# Patient Record
Sex: FEMALE | Race: WHITE | NOT HISPANIC OR LATINO | Employment: FULL TIME | URBAN - METROPOLITAN AREA
[De-identification: names, ages, dates, MRNs, and addresses within clinical notes are randomized per-mention and may not be internally consistent; named-entity substitution may affect disease eponyms.]

---

## 2018-02-12 ENCOUNTER — OFFICE VISIT (OUTPATIENT)
Dept: INTERNAL MEDICINE | Facility: CLINIC | Age: 48
End: 2018-02-12
Payer: COMMERCIAL

## 2018-02-12 VITALS
BODY MASS INDEX: 36.59 KG/M2 | DIASTOLIC BLOOD PRESSURE: 96 MMHG | TEMPERATURE: 96 F | HEART RATE: 75 BPM | HEIGHT: 64 IN | SYSTOLIC BLOOD PRESSURE: 140 MMHG | WEIGHT: 214.31 LBS | OXYGEN SATURATION: 95 %

## 2018-02-12 DIAGNOSIS — R22.31 NODULE OF FINGER OF RIGHT HAND: Primary | ICD-10-CM

## 2018-02-12 PROCEDURE — 99999 PR PBB SHADOW E&M-NEW PATIENT-LVL III: CPT | Mod: PBBFAC,,, | Performed by: PHYSICIAN ASSISTANT

## 2018-02-12 PROCEDURE — 3008F BODY MASS INDEX DOCD: CPT | Mod: S$GLB,,, | Performed by: PHYSICIAN ASSISTANT

## 2018-02-12 PROCEDURE — 99203 OFFICE O/P NEW LOW 30 MIN: CPT | Mod: S$GLB,,, | Performed by: PHYSICIAN ASSISTANT

## 2018-02-12 RX ORDER — SULFAMETHOXAZOLE AND TRIMETHOPRIM 800; 160 MG/1; MG/1
1 TABLET ORAL 2 TIMES DAILY
Qty: 20 TABLET | Refills: 0 | Status: SHIPPED | OUTPATIENT
Start: 2018-02-12 | End: 2018-02-22

## 2018-02-12 NOTE — PROGRESS NOTES
Subjective:       Patient ID: Monserrat Lyels is a 47 y.o. female.    Chief Complaint: Hand Pain (right hand middle finger)    Hand Pain    The incident occurred more than 1 week ago. There was no injury mechanism. The pain is present in the right fingers. The pain is mild. Pertinent negatives include no chest pain. Associated symptoms comments: Has a nodule present over the PIP joint of the right finger. The symptoms are aggravated by movement and palpation. Treatments tried: Was seen at [facility], and they said it was arthitis.     Review of Systems   Constitutional: Negative for chills, fatigue and fever.   Respiratory: Negative for chest tightness and shortness of breath.    Cardiovascular: Negative for chest pain.   Gastrointestinal: Negative for abdominal pain.       Objective:      Physical Exam   Constitutional: She appears well-developed and well-nourished. No distress.   Musculoskeletal:        Hands:  Skin: She is not diaphoretic.   Nursing note and vitals reviewed.      Assessment:       1. Nodule of finger of right hand        Plan:       Nodule of finger of right hand  -     Ambulatory referral to Orthopedics    Other orders  -     sulfamethoxazole-trimethoprim 800-160mg (BACTRIM DS) 800-160 mg Tab; Take 1 tablet by mouth 2 (two) times daily.  Dispense: 20 tablet; Refill: 0

## 2018-02-20 DIAGNOSIS — M79.641 RIGHT HAND PAIN: Primary | ICD-10-CM

## 2018-02-21 ENCOUNTER — OFFICE VISIT (OUTPATIENT)
Dept: ORTHOPEDICS | Facility: CLINIC | Age: 48
End: 2018-02-21
Payer: COMMERCIAL

## 2018-02-21 ENCOUNTER — HOSPITAL ENCOUNTER (OUTPATIENT)
Dept: RADIOLOGY | Facility: HOSPITAL | Age: 48
Discharge: HOME | End: 2018-02-21
Attending: ORTHOPAEDIC SURGERY
Payer: COMMERCIAL

## 2018-02-21 VITALS — BODY MASS INDEX: 36.54 KG/M2 | HEIGHT: 64 IN | WEIGHT: 214 LBS

## 2018-02-21 DIAGNOSIS — M79.641 RIGHT HAND PAIN: ICD-10-CM

## 2018-02-21 DIAGNOSIS — M79.644 FINGER PAIN, RIGHT: Primary | ICD-10-CM

## 2018-02-21 DIAGNOSIS — L02.511 ABSCESS OF FINGER, RIGHT: ICD-10-CM

## 2018-02-21 PROCEDURE — 73130 X-RAY EXAM OF HAND: CPT | Mod: TC,PO,RT

## 2018-02-21 PROCEDURE — 99203 OFFICE O/P NEW LOW 30 MIN: CPT | Mod: S$GLB,,, | Performed by: ORTHOPAEDIC SURGERY

## 2018-02-21 PROCEDURE — 73130 X-RAY EXAM OF HAND: CPT | Mod: 26,RT,, | Performed by: RADIOLOGY

## 2018-02-21 PROCEDURE — 99999 PR PBB SHADOW E&M-EST. PATIENT-LVL II: CPT | Mod: PBBFAC,,, | Performed by: ORTHOPAEDIC SURGERY

## 2018-02-21 PROCEDURE — 3008F BODY MASS INDEX DOCD: CPT | Mod: S$GLB,,, | Performed by: ORTHOPAEDIC SURGERY

## 2018-02-21 RX ORDER — AMOXICILLIN AND CLAVULANATE POTASSIUM 500; 125 MG/1; MG/1
1 TABLET, FILM COATED ORAL 3 TIMES DAILY
Qty: 30 TABLET | Refills: 0 | Status: SHIPPED | OUTPATIENT
Start: 2018-02-21 | End: 2018-02-28 | Stop reason: SDUPTHER

## 2018-02-21 NOTE — LETTER
February 21, 2018      Juwan Thomas III, PA-C  [address] [place] [address]           [facility]  [address] [address]  Phone: [phone]          Patient: Monserrat Lyles   MR Number: [id number]   YOB: 1970   Date of Visit: 2/21/2018       Dear Juwan Thomas III:    Thank you for referring Monserrat Lyles to me for evaluation. Attached you will find relevant portions of my assessment and plan of care.    If you have questions, please do not hesitate to call me. I look forward to following Monserrat Lyles along with you.    Sincerely,    Denzel Ruggiero MD    Enclosure  CC:  No Recipients    If you would like to receive this communication electronically, please contact [email or web address] or [phone] to request more information on [employer] Link access.    For providers and/or their staff who would like to refer a patient to [facility], please contact us through our one-stop-shop provider referral line, Radhika Davis, at [phone].    If you feel you have received this communication in error or would no longer like to receive these types of communications, please e-mail [email or web address]

## 2018-02-21 NOTE — PROGRESS NOTES
DATE: 2/21/2018  PATIENT: Monserrat Lyles  REFERRING MD:   CHIEF COMPLAINT:   Chief Complaint   Patient presents with    Hand Pain     right        HISTORY:  Monserrat Lyles is a 47 y.o. right hand dominant female  who presents for initial evaluation of right long finger swelling and drainage.  She notes proximal one-month ago she was moving things developed discomfort to her PIP joint of her long finger.  She states she weeks ago he started noticing drainage from what she thought was a cyst.  She went to urgent care is placed on Bactrim.  She states that he continues to lose.  She works in a [employer].  She denies any previous injuries to her hand.  Pain is reported at 0/10.    PAST MEDICAL/SURGICAL HISTORY:  History reviewed. No pertinent past medical history.  History reviewed. No pertinent surgical history.    Current Medications:   Current Outpatient Prescriptions:     sulfamethoxazole-trimethoprim 800-160mg (BACTRIM DS) 800-160 mg Tab, Take 1 tablet by mouth 2 (two) times daily., Disp: 20 tablet, Rfl: 0    Family History: family history was reviewed and is noncontributory  Social History:   Social History     Social History    Marital status:      Spouse name: N/A    Number of children: N/A    Years of education: N/A     Occupational History    Not on file.     Social History Main Topics    Smoking status: Not on file    Smokeless tobacco: Not on file    Alcohol use Not on file    Drug use: Unknown    Sexual activity: Not on file     Other Topics Concern    Not on file     Social History Narrative    No narrative on file       ROS:  Constitution: Negative for chills, fever, and sweats. Negative for unexplained weight loss.  HENT: Negative for headaches and blurry vision.   Cardiovascular: Negative for chest pain, irregular heartbeat, leg swelling and palpitations.   Respiratory: Negative for cough and shortness of breath.   Gastrointestinal: Negative for abdominal pain, heartburn, nausea  "and vomiting.   Genitourinary: Negative for bladder incontinence and dysuria.   Musculoskeletal: Negative for systemic arthritis, joint swelling, muscle weakness and myalgias.   Neurological: Negative for numbness.   Psychiatric/Behavioral: Negative for depression.   Endocrine: Negative for polyuria.   Hematologic/Lymphatic: Negative for bleeding disorders.  Skin: Negative for poor wound healing.       PHYSICAL EXAM:  Ht 5' 4" (1.626 m)   Wt 97.1 kg (214 lb)   BMI 36.73 kg/m²   100 small abscess which is draining bursitis fluid.  This is on the dorsal radial PIP joint.  Range of motion PIP joint is near full but with some discomfort.  Sensation intact.      IMAGING:   X-rays of the right hand are personally reviewed.  No acute fractures or dislocations are seen.  No bony abnormalities noted.  Mild soft tissue swelling about the PIP joint of the long finger is noted.     ASSESSMENT:   Abscess versus infected mucoid cyst right long finger PIP joint    PLAN:  The nature of the diagnosis, using models and diagrams when appropriate, was explained to the patient in detail.  I have explained that she does not appear to have responded to the Bactrim.  I placed her on Augmentin 500 mg 3 times a day for 1 week to see if this will improve the infection.  I will see her back in 1 weeks' time for reexam.  I've also recommended warm soaks twice a day.  Should she continue to not show signs of improvement that we, consideration for surgical I&D will be considered    This note was dictated using voice recognition software. Please excuse any grammatical or typographical errors.  "

## 2018-02-28 ENCOUNTER — OFFICE VISIT (OUTPATIENT)
Dept: ORTHOPEDICS | Facility: CLINIC | Age: 48
End: 2018-02-28
Payer: COMMERCIAL

## 2018-02-28 VITALS — HEIGHT: 64 IN | BODY MASS INDEX: 36.54 KG/M2 | WEIGHT: 214 LBS

## 2018-02-28 DIAGNOSIS — L02.511 ABSCESS OF FINGER, RIGHT: Primary | ICD-10-CM

## 2018-02-28 DIAGNOSIS — M79.644 FINGER PAIN, RIGHT: ICD-10-CM

## 2018-02-28 PROCEDURE — 3008F BODY MASS INDEX DOCD: CPT | Mod: S$GLB,,, | Performed by: ORTHOPAEDIC SURGERY

## 2018-02-28 PROCEDURE — 99213 OFFICE O/P EST LOW 20 MIN: CPT | Mod: S$GLB,,, | Performed by: ORTHOPAEDIC SURGERY

## 2018-02-28 PROCEDURE — 99999 PR PBB SHADOW E&M-EST. PATIENT-LVL II: CPT | Mod: PBBFAC,,, | Performed by: ORTHOPAEDIC SURGERY

## 2018-02-28 RX ORDER — AMOXICILLIN AND CLAVULANATE POTASSIUM 500; 125 MG/1; MG/1
1 TABLET, FILM COATED ORAL 3 TIMES DAILY
Qty: 30 TABLET | Refills: 0 | Status: SHIPPED | OUTPATIENT
Start: 2018-02-28 | End: 2018-04-11

## 2018-02-28 NOTE — PROGRESS NOTES
"DATE: 2/28/2018  PATIENT: Monserrat Lyles    Attending Physician: Denzel Ruggiero M.D.    HISTORY:  Monserrat Lyles is a 47 y.o. female who returns for follow up evaluation of  her right long finger PIP joint.  She is diagnosed with abscess versus mucoid cyst.  She is been on Augmentin.  She does note moderate improvement.  There is no longer drainage.  However there is a small scab swelling still present.  Pain is reported at 0/10 today.    PMH/PSH/FamHx/SocHx:  Reviewed and unchanged from prior visit    ROS:  Constitution: Negative for chills, fever, and sweats. Negative for unexplained weight loss.  HENT: Negative for headaches and blurry vision.   Cardiovascular: Negative for chest pain, irregular heartbeat, leg swelling and palpitations.   Respiratory: Negative for cough and shortness of breath.   Gastrointestinal: Negative for abdominal pain, heartburn, nausea and vomiting.   Genitourinary: Negative for bladder incontinence and dysuria.   Musculoskeletal: Negative for systemic arthritis, joint swelling, muscle weakness and myalgias.   Neurological: Negative for numbness.   Psychiatric/Behavioral: Negative for depression.   Endocrine: Negative for polyuria.   Hematologic/Lymphatic: Negative for bleeding disorders.  Skin: Negative for poor wound healing.       EXAM:  Ht 5' 4" (1.626 m)   Wt 97.1 kg (214 lb)   BMI 36.73 kg/m²   Healthy-appearing female no acute distress.  Examination of theright long fin right long finger reveals the wound is scabbed over.  There is still swelling and some induration.  There is very mild erythema but much less than previously.  There is no active drainage noted.  Range of motion of the PIP joint is within normal limits.  Sensation is intact.    IMAGING:   No x-rays are performed today.    ASSESSMENT:  Abscess versus infected mucoid cyst right long finger PIP joint    PLAN:  The implications of the patient's evolution of symptoms and findings were explained to the " patient in detail.  I have explained to Monserrat that finger clearly looks better than last week.  I've recommended 1 more week of Augmentin and observation.  She'll follow-up next week to see Angi Mendoza NP for evaluation to make sure the finger continues to improve..          This note was dictated using voice recognition software. Please excuse any grammatical or typographical errors.

## 2018-03-07 ENCOUNTER — OFFICE VISIT (OUTPATIENT)
Dept: ORTHOPEDICS | Facility: CLINIC | Age: 48
End: 2018-03-07
Payer: COMMERCIAL

## 2018-03-07 VITALS — HEIGHT: 64 IN | WEIGHT: 214 LBS | BODY MASS INDEX: 36.54 KG/M2

## 2018-03-07 DIAGNOSIS — L02.511 ABSCESS OF FINGER, RIGHT: Primary | ICD-10-CM

## 2018-03-07 PROCEDURE — 99999 PR PBB SHADOW E&M-EST. PATIENT-LVL II: CPT | Mod: PBBFAC,,, | Performed by: NURSE PRACTITIONER

## 2018-03-07 PROCEDURE — 99213 OFFICE O/P EST LOW 20 MIN: CPT | Mod: S$GLB,,, | Performed by: NURSE PRACTITIONER

## 2018-03-07 NOTE — PROGRESS NOTES
"DATE: 3/7/2018  PATIENT: Monserrat Lyles    Attending Physician: Denzel Ruggiero M.D.      HISTORY:  Monserrat Lyles is a 47 y.o. female who returns for a second follow up evaluation of  her right long finger PIP joint.  She is diagnosed with abscess versus mucoid cyst.  She is been on Augmentin since 2/21 and continues taking as prescribed.  She does note continued moderate improvement.  There has been no drainage since her first follow up in the last week.  There is a small scab and some redness and swelling still present.  Pain is reported at 0/10 today.  She works in a [employer] and bumps her finger often, there is no pain unless she bumps her finger.  She reports her personal observation of improvement in the wound.    PMH/PSH/FamHx/SocHx:  Reviewed and unchanged from prior visit    ROS:  Constitution: Negative for chills, fever, and sweats. Negative for unexplained weight loss.  HENT: Negative for headaches and blurry vision.   Cardiovascular: Negative for chest pain, irregular heartbeat, leg swelling and palpitations.   Respiratory: Negative for cough and shortness of breath.   Gastrointestinal: Negative for abdominal pain, heartburn, nausea and vomiting.   Genitourinary: Negative for bladder incontinence and dysuria.   Musculoskeletal: Negative for systemic arthritis, joint swelling, muscle weakness and myalgias.   Neurological: Negative for numbness.   Psychiatric/Behavioral: Negative for depression.   Endocrine: Negative for polyuria.   Hematologic/Lymphatic: Negative for bleeding disorders.  Skin: Negative for poor wound healing.     EXAM:  Ht 5' 4" (1.626 m)   Wt 97.1 kg (214 lb)   BMI 36.73 kg/m²   Healthy-appearing female no acute distress.  Examination of theright long fin right long finger reveals the wound is scabbed over.  There is still swelling and some induration.  There is very mild erythema but much less than previously.  There is no active drainage noted.  Range of motion of the PIP " joint is within normal limits.  Sensation is intact.    IMAGING:    No x-rays are performed today.    ASSESSMENT:  Abscess versus infected mucoid cyst right long finger PIP joint    PLAN:  The implications of the patient's evolution of symptoms and findings were explained to the patient in detail.  I agree with Monserrat that finger continues to improve.  she will continue the plan of 1 more week of Augmentin and observation.  She'll follow-up next week  for evaluation to make sure the finger continues to improve..

## 2018-03-14 ENCOUNTER — OFFICE VISIT (OUTPATIENT)
Dept: ORTHOPEDICS | Facility: CLINIC | Age: 48
End: 2018-03-14
Payer: COMMERCIAL

## 2018-03-14 VITALS — WEIGHT: 214 LBS | BODY MASS INDEX: 36.54 KG/M2 | HEIGHT: 64 IN

## 2018-03-14 DIAGNOSIS — Z89.021: Primary | ICD-10-CM

## 2018-03-14 PROCEDURE — 99999 PR PBB SHADOW E&M-EST. PATIENT-LVL II: CPT | Mod: PBBFAC,,, | Performed by: NURSE PRACTITIONER

## 2018-03-14 PROCEDURE — 99212 OFFICE O/P EST SF 10 MIN: CPT | Mod: S$GLB,,, | Performed by: NURSE PRACTITIONER

## 2018-03-14 NOTE — PROGRESS NOTES
"DATE: 3/14/2018  PATIENT: Monserrat Lylse    Attending Physician: Denzel Ruggiero M.D.    HISTORY:  Monserrat Lyles is a 47 y.o. female who returns for follow up evaluation of an abscess of Her right long finger MIP joint. She has no pain in the finger today.   She has continued her antibiotics and reports 2 days left of the current course.    PMH/PSH/FamHx/SocHx:  Reviewed and unchanged from prior visit    ROS:  Constitution: Negative for chills, fever, and sweats. Negative for unexplained weight loss.  HENT: Negative for headaches and blurry vision.   Cardiovascular: Negative for chest pain, irregular heartbeat, leg swelling and palpitations.   Respiratory: Negative for cough and shortness of breath.   Gastrointestinal: Negative for abdominal pain, heartburn, nausea and vomiting.   Genitourinary: Negative for bladder incontinence and dysuria.   Musculoskeletal: Negative for systemic arthritis, joint swelling, muscle weakness and myalgias.   Neurological: Negative for numbness.   Psychiatric/Behavioral: Negative for depression.   Endocrine: Negative for polyuria.   Hematologic/Lymphatic: Negative for bleeding disorders.  Skin: Negative for poor wound healing.       EXAM:  Ht 5' 4" (1.626 m)   Wt 97.1 kg (214 lb)   BMI 36.73 kg/m²   Healthy-appearing female no acute distress.  Examination of the right long finger wound with some redness and minimal scab, there is mostly new skin over the previous abscess.  There is still some induration.   There is no active drainage noted.  Range of motion of the MIP joint is within normal limits.  Sensation is intact.    IMAGING:   no xray performed today    ASSESSMENT:  Right long finger MIP joint abscess versus mucoid cyst    PLAN:  The implications of the patient's evolution of symptoms and findings were explained to the patient in detail.The abscess appears to be healing well.  If symptoms worsen, I have suggested seeing a hand surgeon to remove what may possibly " be a mucoid cyst.  All questions answered and the patient will return to clinic for evaluation of the finger in 1 month.

## 2018-04-11 ENCOUNTER — TELEPHONE (OUTPATIENT)
Dept: ORTHOPEDICS | Facility: CLINIC | Age: 48
End: 2018-04-11

## 2018-04-11 ENCOUNTER — OFFICE VISIT (OUTPATIENT)
Dept: ORTHOPEDICS | Facility: CLINIC | Age: 48
End: 2018-04-11
Payer: COMMERCIAL

## 2018-04-11 VITALS — BODY MASS INDEX: 35.23 KG/M2 | RESPIRATION RATE: 14 BRPM | WEIGHT: 206.38 LBS | HEIGHT: 64 IN

## 2018-04-11 DIAGNOSIS — M67.40 MUCOID CYST OF JOINT: Primary | ICD-10-CM

## 2018-04-11 PROCEDURE — 99212 OFFICE O/P EST SF 10 MIN: CPT | Mod: S$GLB,,, | Performed by: NURSE PRACTITIONER

## 2018-04-11 PROCEDURE — 99999 PR PBB SHADOW E&M-EST. PATIENT-LVL III: CPT | Mod: PBBFAC,,, | Performed by: NURSE PRACTITIONER

## 2018-04-11 NOTE — TELEPHONE ENCOUNTER
Contacted the patient to have her scheduled to see a PA about the cyst on her hand. The patient had questions about payments and was transferred to patient accounts to discuss billing before she scheudle. -AS

## 2018-04-11 NOTE — PROGRESS NOTES
"DATE: 4/11/2018  PATIENT: Monserrat Lyles    Attending Physician: Denzel Ruggiero M.D.    HISTORY:  Monserrat Lyles is a 47 y.o. female who returns for follow up evaluation of  an abscess of Her right long finger PIP joint. She has no pain in the finger today.   She has continued her antibiotics until completed. The nodule still persists and is aggravating her.  She would like a referral today for the orthopedic hand surgeon to remove it.  Her pain today is 0/10 but she works in a [employer] and uses her hands for work.      PMH/PSH/FamHx/SocHx:  Reviewed and unchanged from prior visit    ROS:  Constitution: Negative for chills, fever, and sweats. Negative for unexplained weight loss.  HENT: Negative for headaches and blurry vision.   Cardiovascular: Negative for chest pain, irregular heartbeat, leg swelling and palpitations.   Respiratory: Negative for cough and shortness of breath.   Gastrointestinal: Negative for abdominal pain, heartburn, nausea and vomiting.   Genitourinary: Negative for bladder incontinence and dysuria.   Musculoskeletal: Negative for systemic arthritis, joint swelling, muscle weakness and myalgias.   Neurological: Negative for numbness.   Psychiatric/Behavioral: Negative for depression.   Endocrine: Negative for polyuria.   Hematologic/Lymphatic: Negative for bleeding disorders.  Skin: Negative for poor wound healing.       EXAM:  Resp 14   Ht 5' 4" (1.626 m)   Wt 93.6 kg (206 lb 6.4 oz)   BMI 35.43 kg/m²   Healthy-appearing female no acute distress.  Examination of the right long finger PIP reveals well healed nodule with induration.  No tenderness to palpation.  Some stiffness with flexion.    IMAGING:   no xray performed today     ASSESSMENT:  Right long finger MIP joint abscess versus mucoid cyst    PLAN:  The implications of the patient's evolution of symptoms and findings were explained to the patient in detail.Treatment option discussed included referral to hand surgeon for " removal. All questions answered and the patient will follow up if needed.

## 2018-04-20 ENCOUNTER — TELEPHONE (OUTPATIENT)
Dept: ORTHOPEDICS | Facility: CLINIC | Age: 48
End: 2018-04-20

## 2018-04-20 NOTE — TELEPHONE ENCOUNTER
----- Message from Heidy Tan MA sent at 4/20/2018 11:48 AM CDT -----  Pt was scheduled with Alex Ayon for cyst on finger. Reji suggested she see a hand surgeon. None of the providers in [place] specialize in the hand. Pt needs a referral for  in [place] or  at [facility].

## 2018-04-20 NOTE — TELEPHONE ENCOUNTER
This patient was referred to Dr. Mckeon and stated due to insurance, she could not see him. Patient was then referred to Dr. Elliott in [place] as she would not be able to make the drive to the [place]. Patient was not referred to [facility] providers by our office.

## 2020-01-08 ENCOUNTER — TELEPHONE (OUTPATIENT)
Dept: ORTHOPEDICS | Facility: CLINIC | Age: 50
End: 2020-01-08

## 2020-01-08 NOTE — TELEPHONE ENCOUNTER
Pt stated she is experiening the same symptoms as when she last saw CONRADO Dee.  I informed pt her last visit was 4/2018 and prescribed Augmenting.  I informed pt she would need evaluation of swelling in her finger prior to prescribing prescription for treatment.  I recommended pt to see a doctor ASAP for evaluation of complaint.  Pt declined appointment with Reji on 1/10/20.  I strongly recommended pt see a doctor today for evaluation of her complaint.  Pt verbalized understanding.

## 2020-01-08 NOTE — TELEPHONE ENCOUNTER
----- Message from Yessica Burkett sent at 1/8/2020  8:25 AM CST -----  Contact: pt  States she's calling to see what kind of medicine she was given for the nodule on her finger. Please call pt at [phone]. Thank you